# Patient Record
(demographics unavailable — no encounter records)

---

## 2025-03-07 NOTE — HISTORY OF PRESENT ILLNESS
[Dull/Aching] : dull/aching [Localized] : localized [Tightness] : tightness [de-identified] : Simply got up from seated position, developed sharp pain right groin. No clicking/catching in hip. No low back pain [] : no [FreeTextEntry1] : RT groin  [FreeTextEntry5] : RT groin pain that developed 2 weeks ago. Denies injury/trauma.  of

## 2025-03-07 NOTE — IMAGING
[Scoliosis] : Scoliosis [AP] : anteroposterior [There are no fractures, subluxations or dislocations. No significant abnormalities are seen] : There are no fractures, subluxations or dislocations. No significant abnormalities are seen

## 2025-04-15 NOTE — HISTORY OF PRESENT ILLNESS
[Dull/Aching] : dull/aching [Localized] : localized [Tightness] : tightness [Lying in bed] : lying in bed [de-identified] : 04/15/2025 Needs xray right hip in order for MRI to be authorized 04/04/2025 Doing PT for right hip/groin but not making any progress. No back pain at all  Simply got up from seated position, developed sharp pain right groin. No clicking/catching in hip. No low back pain [] : no [FreeTextEntry1] : RT groin  [FreeTextEntry5] : RT groin pain that developed 2 weeks ago. Denies injury/trauma.  of  [de-identified] : worsens when getting up from sitting

## 2025-04-15 NOTE — ASSESSMENT
[FreeTextEntry1] : Needs MRI right hip to r/o internal derangement/stress fracture Discussed other reasons for groin pain including but not limited by lumbar spine, hernia, intraabdominal, gynecological

## 2025-04-15 NOTE — HISTORY OF PRESENT ILLNESS
[Dull/Aching] : dull/aching [Localized] : localized [Tightness] : tightness [Lying in bed] : lying in bed [de-identified] : 04/15/2025 Needs xray right hip in order for MRI to be authorized 04/04/2025 Doing PT for right hip/groin but not making any progress. No back pain at all  Simply got up from seated position, developed sharp pain right groin. No clicking/catching in hip. No low back pain [] : no [FreeTextEntry1] : RT groin  [FreeTextEntry5] : RT groin pain that developed 2 weeks ago. Denies injury/trauma.  of  [de-identified] : worsens when getting up from sitting

## 2025-04-28 NOTE — HISTORY OF PRESENT ILLNESS
[Dull/Aching] : dull/aching [Localized] : localized [Tightness] : tightness [Lying in bed] : lying in bed [de-identified] : 04/28/2025 She has been followed now for 7 1/2 weeks with right groin pain, difficulty bearing weight. Now also having increasing cramps in calf. She tried some Physical therapy which only seemed to aggravate symptoms. She has been on NSAIDs without benefit 04/15/2025 Needs xray right hip in order for MRI to be authorized 04/04/2025 Doing PT for right hip/groin but not making any progress. No back pain at all  3/7/25- Simply got up from seated position, developed sharp pain right groin. No clicking/catching in hip. No low back pain [] : no [FreeTextEntry1] : RT groin  [FreeTextEntry5] : RT groin pain that developed 2 weeks ago. Denies injury/trauma.  of  [de-identified] : worsens when getting up from sitting

## 2025-04-28 NOTE — ASSESSMENT
[FreeTextEntry1] : Needs MRI right hip to r/o internal derangement/stress fracture, needs MRI lumbar to r/o HNP as is getting more right leg symptoms She has done conservative course including PT, NSAIDs, and rest She has been unable to work in  Discussed other reasons for groin pain including but not limited by lumbar spine, hernia, intraabdominal, gynecological

## 2025-05-22 NOTE — HISTORY OF PRESENT ILLNESS
[Dull/Aching] : dull/aching [Localized] : localized [Tightness] : tightness [Lying in bed] : lying in bed [de-identified] : 05/22/2025 Had MRIs: right hip: stress reaction in acetabular roof, labral tear. MRI lumbar: dessicated signal L4/5 with bulge, no right nerve root impingement 04/28/2025 She has been followed now for 7 1/2 weeks with right groin pain, difficulty bearing weight. Now also having increasing cramps in calf. She tried some Physical therapy which only seemed to aggravate symptoms. She has been on NSAIDs without benefit 04/15/2025 Needs xray right hip in order for MRI to be authorized 04/04/2025 Doing PT for right hip/groin but not making any progress. No back pain at all  3/7/25- Simply got up from seated position, developed sharp pain right groin. No clicking/catching in hip. No low back pain [] : no [FreeTextEntry1] : RT groin  [FreeTextEntry5] : RT groin pain that developed 2 weeks ago. Denies injury/trauma.  of  [de-identified] : worsens when getting up from sitting [de-identified] : MRI L-spine (5/19/25) & RT hip (5/21/25) [place]

## 2025-05-22 NOTE — ASSESSMENT
[FreeTextEntry1] : I reviewed the MRIs of her right hip and lumbar The sharp pain more likely from the bone reaction in acetabulum Will have her see Dr. Adan regarding the labral tear The low back is not the source of her pain

## 2025-05-22 NOTE — DATA REVIEWED
[Right] : of the right [Hip] : hip [MRI] : MRI [Lumbar Spine] : lumbar spine [I independently reviewed and interpreted images and report] : I independently reviewed and interpreted images and report

## 2025-05-27 NOTE — DATA REVIEWED
[MRI] : MRI [Right] : of the right [Hip] : hip [I independently reviewed and interpreted images and report] : I independently reviewed and interpreted images and report [FreeTextEntry1] : ant/sup labral tear

## 2025-05-27 NOTE — HISTORY OF PRESENT ILLNESS
[de-identified] : KATELYN PETERS is a 29 year female here with RT  hip pain.  Pain has been present for 3 months and is located  anteriorly.  Rates pain as 4 /10 on pain scale.   Injury - N Mechanical symptoms - stabbing and tingling Exacerbating factors/activities/positions - N Pain during or after activity - Y Back pain - N Radicular pain - N   Previous Treatment: NSAIDs: N PT: Y Activity Mods: N Surgery: N   Injections: N Numbing phase relief: [ ] Steroid phase relief: [ ]     Occupation: day care woker Sports/Recreational Activities: [ ]

## 2025-05-27 NOTE — DISCUSSION/SUMMARY
[de-identified] : KATELYN PETERS  has R hip impingement and acetabular labral tear.  She has tried and failed conservative treatment.   We discussed surgery in the form of hip arthroscopy today.  Risks related to hip arthroscopy include but are not limited to surgical site infection, damage to neurovascular structures around the hip, persistent pain, damage to intraarticular structures, scar tissue and adhesion formation, heterotopic bone formation post operative blood clot and the need for post operative blood thinners.  We discussed the requirement for a prolonged period of limited weight bearing on crutches post operatively and the importance of physical therapy.  she  was able to state understanding of the above and was agreeable to proceed with surgery.  Will see her back 2 weeks ahead of time to re-discuss surgery.  Prior to appointment and during encounter with patient extensive medical records were reviewed including but not limited to, hospital records, out patient records, imaging results, and lab data. During this appointment the patient was examined, diagnoses were discussed and explained in a face to face manner. In addition extensive time was spent reviewing aforementioned diagnostic studies. Counseling including abnormal image results, differential diagnoses, treatment options, risk and benefits, lifestyle changes, current condition, and current medications was performed. Patient's comments, questions, and concerns were address and patient verbalized understanding.

## 2025-05-27 NOTE — IMAGING
[Right] : right hip with pelvis [All Views] : anteroposterior, lateral [Femoral CAM lesion with Alpha angle greater than 50] : Femoral CAM lesion with Alpha angle greater than 50 [de-identified] : General: NAD, A&Ox3 Gait: Non-antalgic, no Trendelenburg Foot Progression: neutral Knee Progression: neutral   R Hip Exam: Pain with Log Roll - negative Flexion: 110 Pain with Hyperflexion - yes FADIR - pos FORREST - neg Extension: 20 Flexion Contracture - none Prone Apprehension Test - neg Prone Rotation Test - symmetric Ischial tenderness - none Trochanteric tenderness - none   Abduction - 4 /5, pain - yes Adduction - 5 /5 Supine resisted SLR - 5 /5, pain - no Seated resisted hip flexion - 5 /5, pain - no Dorsiflexion 5/5 Plantarflexion 5/5 EHL 5/5 DP/PT 2+, foot is warm and well perfused        Leg Lengths: symmetric

## 2025-06-24 NOTE — IMAGING
[Right] : right hip with pelvis [All Views] : anteroposterior, lateral [Femoral CAM lesion with Alpha angle greater than 50] : Femoral CAM lesion with Alpha angle greater than 50 [de-identified] : General: NAD, A&Ox3 Gait: Non-antalgic, no Trendelenburg Foot Progression: neutral Knee Progression: neutral   R Hip Exam: Pain with Log Roll - negative Flexion: 110 Pain with Hyperflexion - yes FADIR - pos FORREST - neg Extension: 20 Flexion Contracture - none Prone Apprehension Test - neg Prone Rotation Test - symmetric Ischial tenderness - none Trochanteric tenderness - none   Abduction - 4 /5, pain - yes Adduction - 5 /5 Supine resisted SLR - 5 /5, pain - no Seated resisted hip flexion - 5 /5, pain - no Dorsiflexion 5/5 Plantarflexion 5/5 EHL 5/5 DP/PT 2+, foot is warm and well perfused        Leg Lengths: symmetric

## 2025-06-24 NOTE — HISTORY OF PRESENT ILLNESS
[de-identified] : 6/24/25- pt is here for a FU. states no improvement since last visit.   KATELYN PETERS is a 29 year female here with RT  hip pain.  Pain has been present for 3 months and is located  anteriorly.  Rates pain as 4 /10 on pain scale.   Injury - N Mechanical symptoms - stabbing and tingling Exacerbating factors/activities/positions - N Pain during or after activity - Y Back pain - N Radicular pain - N   Previous Treatment: NSAIDs: N PT: Y Activity Mods: N Surgery: N   Injections: N Numbing phase relief: [ ] Steroid phase relief: [ ]     Occupation: day care woker Sports/Recreational Activities: [ ]

## 2025-06-24 NOTE — DISCUSSION/SUMMARY
[de-identified] : KATELYN PETERS  has R hip impingement and acetabular labral tear.  She has tried and failed conservative treatment.   We discussed surgery in the form of hip arthroscopy today.  Risks related to hip arthroscopy include but are not limited to surgical site infection, damage to neurovascular structures around the hip, persistent pain, damage to intraarticular structures, scar tissue and adhesion formation, heterotopic bone formation post operative blood clot and the need for post operative blood thinners.  We discussed the requirement for a prolonged period of limited weight bearing on crutches post operatively and the importance of physical therapy.  she  was able to state understanding of the above and was agreeable to proceed with surgery.  Discussed brace/pt/post op meds/recovery timeline All questions answered, agreeable with plan  Prior to appointment and during encounter with patient extensive medical records were reviewed including but not limited to, hospital records, out patient records, imaging results, and lab data. During this appointment the patient was examined, diagnoses were discussed and explained in a face to face manner. In addition extensive time was spent reviewing aforementioned diagnostic studies. Counseling including abnormal image results, differential diagnoses, treatment options, risk and benefits, lifestyle changes, current condition, and current medications was performed. Patient's comments, questions, and concerns were address and patient verbalized understanding.

## 2025-07-22 NOTE — IMAGING
[Right] : right hip with pelvis [All Views] : anteroposterior, lateral [Femoral CAM lesion with Alpha angle greater than 50] : Femoral CAM lesion with Alpha angle greater than 50 [de-identified] : R Hip: Incision well healed, edges well approximated No erythema or drainage Numbness/paresthesias along anterolateral thigh - absent No calf tenderness Motor and sensation intact all distributions Foot WWP, DP2+

## 2025-07-22 NOTE — DISCUSSION/SUMMARY
[de-identified] :  KATELYN PETERS is doing very well s/p R hip arthroscopy   Continue PT, rehab guidelines discussed today Continue DVT ppx Continue HO prophylaxis 50% WB with crutches x2 weeks Sutures removed Wound care discussed - no submerging incision underwater for 6 weeks from date of surgery F/u in 4 weeks for recheck or sooner if questions/concerns arise All questions answered, in agreement with this plan

## 2025-07-22 NOTE — HISTORY OF PRESENT ILLNESS
[de-identified] : 07/22/2025: 1st PO. denies fevers/chills. states recovery is going well. ambulating w/ brace and crutches.   6/24/25- pt is here for a FU. states no improvement since last visit.   KATELYN PETERS is a 29 year female here with RT  hip pain.  Pain has been present for 3 months and is located  anteriorly.  Rates pain as 4 /10 on pain scale.   Injury - N Mechanical symptoms - stabbing and tingling Exacerbating factors/activities/positions - N Pain during or after activity - Y Back pain - N Radicular pain - N   Previous Treatment: NSAIDs: N PT: Y Activity Mods: N Surgery: N   Injections: N Numbing phase relief: [ ] Steroid phase relief: [ ]     Occupation: day care woker Sports/Recreational Activities: [ ]